# Patient Record
Sex: MALE | Race: WHITE | URBAN - NONMETROPOLITAN AREA
[De-identification: names, ages, dates, MRNs, and addresses within clinical notes are randomized per-mention and may not be internally consistent; named-entity substitution may affect disease eponyms.]

---

## 2019-04-06 ENCOUNTER — OFFICE VISIT (OUTPATIENT)
Dept: FAMILY MEDICINE | Facility: OTHER | Age: 44
End: 2019-04-06
Attending: NURSE PRACTITIONER
Payer: COMMERCIAL

## 2019-04-06 VITALS
HEART RATE: 88 BPM | WEIGHT: 217.5 LBS | DIASTOLIC BLOOD PRESSURE: 76 MMHG | SYSTOLIC BLOOD PRESSURE: 138 MMHG | RESPIRATION RATE: 16 BRPM | TEMPERATURE: 97.8 F

## 2019-04-06 DIAGNOSIS — R07.0 THROAT PAIN: ICD-10-CM

## 2019-04-06 DIAGNOSIS — J02.9 VIRAL PHARYNGITIS: Primary | ICD-10-CM

## 2019-04-06 LAB
DEPRECATED S PYO AG THROAT QL EIA: NORMAL
SPECIMEN SOURCE: NORMAL

## 2019-04-06 PROCEDURE — 99213 OFFICE O/P EST LOW 20 MIN: CPT | Performed by: NURSE PRACTITIONER

## 2019-04-06 PROCEDURE — 87880 STREP A ASSAY W/OPTIC: CPT | Performed by: NURSE PRACTITIONER

## 2019-04-06 ASSESSMENT — ENCOUNTER SYMPTOMS
CONSTITUTIONAL NEGATIVE: 1
MUSCULOSKELETAL NEGATIVE: 1
TROUBLE SWALLOWING: 0
EYES NEGATIVE: 1
NEUROLOGICAL NEGATIVE: 1
SORE THROAT: 1
ABDOMINAL PAIN: 0
COUGH: 0

## 2019-04-06 ASSESSMENT — PAIN SCALES - GENERAL: PAINLEVEL: NO PAIN (0)

## 2019-04-06 NOTE — NURSING NOTE
Chief Complaint   Patient presents with     Pharyngitis     Patient presents to the clinic today for sore throat issues for the past 2.5 weeks.     Initial /76 (BP Location: Right arm, Patient Position: Sitting, Cuff Size: Adult Regular)   Pulse 88   Temp 97.8  F (36.6  C) (Tympanic)   Resp 16   Wt 98.7 kg (217 lb 8 oz)  There is no height or weight on file to calculate BMI.  Medication Reconciliation: complete    Kacie Guillaume LPN

## 2019-04-06 NOTE — PATIENT INSTRUCTIONS
Patient Education     When You Have a Sore Throat    A sore throat can be painful. There are many reasons why you may have a sore throat. Your healthcare provider will work with you to find the cause of your sore throat. He or she will also find the best treatment for you.  What causes a sore throat?  Sore throats can be caused or worsened by:    Cold or flu viruses    Bacteria    Irritants such as tobacco smoke or air pollution    Acid reflux  A healthy throat  The tonsils are on the sides of the throat near the base of the tongue. They collect viruses and bacteria and help fight infection. The throat (pharynx) is the passage for air. Mucus from the nasal cavity also moves down the passage.  An inflamed throat  The tonsils and pharynx can become inflamed due to a cold or flu virus. Postnasal drip (excess mucus draining from the nasal cavity) can irritate the throat. It can also make the throat or tonsils more likely to be infected by bacteria. Severe, untreated tonsillitis in children or adults can cause a pocket of pus (abscess) to form near the tonsil.  Your evaluation  A medical evaluation can help find the cause of your sore throat. It can also help your healthcare provider choose the best treatment for you. The evaluation may include a health history, physical exam, and diagnostic tests.  Health history  Your healthcare provider may ask you the following:    How long has the sore throat lasted and how have you been treating it?    Do you have any other symptoms, such as body aches, fever, or cough?    Does your sore throat recur? If so, how often? How many days of school or work have you missed because of a sore throat?    Do you have trouble eating or swallowing?    Have you been told that you snore or have other sleep problems?    Do you have bad breath?    Do you cough up bad-tasting mucus?  Physical exam  During the exam, your healthcare provider checks your ears, nose, and throat for problems. He or she  "also checks for swelling in the neck, and may listen to your chest.  Possible tests  Other tests your healthcare provider may perform include:    A throat swab to check for bacteria such as streptococcus (the bacteria that causes strep throat)    A blood test to check for mononucleosis (a viral infection)    A chest X-ray to rule out pneumonia, especially if you have a cough  Treating a sore throat  Treatment depends on many factors. What is the likely cause? Is the problem recent? Does it keep coming back? In many cases, the best thing to do is to treat the symptoms, rest, and let the problem heal itself. Antibiotics may help clear up some bacterial infections. For cases of severe or recurring tonsillitis, the tonsils may need to be removed.  Relieving your symptoms    Don t smoke, and avoid secondhand smoke.    For children, try throat sprays or Popsicles. Adults and older children may try lozenges.    Drink warm liquids to soothe the throat and help thin mucus. Avoid alcohol, spicy foods, and acidic drinks such as orange juice. These can irritate the throat.    Gargle with warm saltwater (1 teaspoon of salt to 8 ounces of warm water).    Use a humidifier to keep air moist and relieve throat dryness.    Try over-the-counter pain relievers such as acetaminophen or ibuprofen. Use as directed, and don t exceed the recommended dose. Don t give aspirin to children.   Are antibiotics needed?  If your sore throat is due to a bacterial infection, antibiotics may speed healing and prevent complications. Although group A streptococcus (\"strep throat\" or GAS) is the major treatable infection for a sore throat, GAS causes only 5% to 15% of sore throats in adults who seek medical care. Most sore throats are caused by cold or flu viruses. And antibiotics don t treat viral illness. In fact, using antibiotics when they re not needed may produce bacteria that are harder to kill. Your healthcare provider will prescribe antibiotics " only if he or she thinks they are likely to help.  If antibiotics are prescribed  Take the medicine exactly as directed. Be sure to finish your prescription even if you re feeling better. And be sure to ask your healthcare provider or pharmacist what side effects are common and what to do about them.  Is surgery needed?  In some cases, tonsils need to be removed. This is often done as outpatient (same-day) surgery. Your healthcare provider may advise removing the tonsils in cases of:    Several severe bouts of tonsillitis in a year.  Severe  episodes include those that lead to missed days of school or work, or that need to be treated with antibiotics.    Tonsillitis that causes breathing problems during sleep    Tonsillitis caused by food particles collecting in pouches in the tonsils (cryptic tonsillitis)  Call your healthcare provider if any of the following occur:    Symptoms worsen, or new symptoms develop.    Swollen tonsils make breathing difficult.    The pain is severe enough to keep you from drinking liquids.    A skin rash, hives, or wheezing develops. Any of these could signal an allergic reaction to antibiotics.    Symptoms don t improve within a week.    Symptoms don t improve within 2 to 3 days of starting antibiotics.   Date Last Reviewed: 10/1/2016    2367-7508 [employer]. [address] [place] [address], [place] [address]. All rights reserved. This information is not intended as a substitute for professional medical care. Always follow your healthcare professional's instructions.

## 2019-04-06 NOTE — PROGRESS NOTES
SUBJECTIVE:   Darren Aguillon is a 43 year old male who presents to clinic today for the following health issues:    HPI  Presents with a sore throat for 3 days. Lingering cough from URI 3 weeks ago, no sinus congestion. No fever. Eating and drinking well. No analgesics.     There are no active problems to display for this patient.    Past Medical History:   Diagnosis Date     Personal history of other medical treatment (CODE)     No Comments Provided      Past Surgical History:   Procedure Laterality Date     OTHER SURGICAL HISTORY      [id number],NO PREVIOUS SURGERY     No family history on file.  Social History     Tobacco Use     Smoking status: Never Smoker     Smokeless tobacco: Never Used   Substance Use Topics     Alcohol use: No     Social History     Social History Narrative    p 9/24/2013.     No current outpatient medications on file.     No Known Allergies    Review of Systems   Constitutional: Negative.    HENT: Positive for sore throat. Negative for trouble swallowing.    Eyes: Negative.    Respiratory: Negative for cough.    Gastrointestinal: Negative for abdominal pain.   Musculoskeletal: Negative.    Skin: Negative.    Neurological: Negative.         OBJECTIVE:     /76 (BP Location: Right arm, Patient Position: Sitting, Cuff Size: Adult Regular)   Pulse 88   Temp 97.8  F (36.6  C) (Tympanic)   Resp 16   Wt 98.7 kg (217 lb 8 oz)   There is no height or weight on file to calculate BMI.  Physical Exam   Constitutional: He is oriented to person, place, and time. He appears well-developed and well-nourished. No distress.   HENT:   Head: Normocephalic.   Right Ear: Tympanic membrane and external ear normal.   Left Ear: Tympanic membrane and external ear normal.   Nose: Nose normal.   Mouth/Throat: Uvula is midline, oropharynx is clear and moist and mucous membranes are normal. No oropharyngeal exudate.   Eyes: Conjunctivae are normal. Right eye exhibits no discharge. Left eye exhibits no  discharge.   Neck: Normal range of motion. Neck supple.   Cardiovascular: Normal rate, regular rhythm and normal heart sounds.   No murmur heard.  Pulmonary/Chest: Effort normal and breath sounds normal.   Musculoskeletal: Normal range of motion.   Lymphadenopathy:     He has no cervical adenopathy.   Neurological: He is alert and oriented to person, place, and time.   Skin: Skin is warm and dry. He is not diaphoretic.   Psychiatric: He has a normal mood and affect. His behavior is normal.   Nursing note and vitals reviewed.      Diagnostic Test Results:  Results for orders placed or performed in visit on 04/06/19 (from the past 24 hour(s))   Rapid strep screen   Result Value Ref Range    Specimen Description Throat     Rapid Strep A Screen       Negative presumptive for Group A Beta Streptococcus       ASSESSMENT/PLAN:       ICD-10-CM    1. Viral pharyngitis J02.9    2. Throat pain R07.0 Rapid strep screen     Afebrile, benign exam, rapid strep negative.     PLAN:  Conservative treatments advised:   Tylenol, Ibuprofen, Fluids, Rest and Saline nasal spray    Followup:  If not improving or if condition worsens, follow up with your Primary Care Provider  Return here as needed.   I explained my diagnostic considerations and recommendations to the patient, who voiced understanding and agreement with the treatment plan. All questions were answered.     Disclaimer:  This note consists of words and symbols derived from keyboarding, dictation, or using voice recognition software. As a result, there may be errors in the script that have gone undetected. Please consider this when interpreting information found in this note.      NITIN Mosley, NP-C  4/6/2019 at 1:36 PM  [facility]

## 2019-07-30 ENCOUNTER — OFFICE VISIT (OUTPATIENT)
Dept: FAMILY MEDICINE | Facility: OTHER | Age: 44
End: 2019-07-30
Attending: NURSE PRACTITIONER
Payer: COMMERCIAL

## 2019-07-30 VITALS
WEIGHT: 214.2 LBS | OXYGEN SATURATION: 98 % | SYSTOLIC BLOOD PRESSURE: 128 MMHG | TEMPERATURE: 97.3 F | BODY MASS INDEX: 31.73 KG/M2 | DIASTOLIC BLOOD PRESSURE: 70 MMHG | HEIGHT: 69 IN | HEART RATE: 62 BPM | RESPIRATION RATE: 16 BRPM

## 2019-07-30 DIAGNOSIS — L25.9 CONTACT DERMATITIS, UNSPECIFIED CONTACT DERMATITIS TYPE, UNSPECIFIED TRIGGER: Primary | ICD-10-CM

## 2019-07-30 PROCEDURE — 99213 OFFICE O/P EST LOW 20 MIN: CPT | Performed by: NURSE PRACTITIONER

## 2019-07-30 RX ORDER — FLUOCINONIDE 0.5 MG/G
OINTMENT TOPICAL 2 TIMES DAILY
Qty: 60 G | Refills: 0 | Status: SHIPPED | OUTPATIENT
Start: 2019-07-30 | End: 2019-08-09

## 2019-07-30 ASSESSMENT — PAIN SCALES - GENERAL: PAINLEVEL: NO PAIN (0)

## 2019-07-30 ASSESSMENT — MIFFLIN-ST. JEOR: SCORE: 1851.98

## 2019-07-30 NOTE — PATIENT INSTRUCTIONS
"Patient Education     Contact Dermatitis  Contact dermatitis is a skin rash caused by something that touches the skin and makes it irritated and inflamed. Your skin may be red, swollen, dry, and may be cracked. Blisters may form and ooze. The rash will itch.  Contact dermatitis can form on the face and neck, backs of hands, forearms, genitals, and lower legs.  People can get contact dermatitis from lots of sources. These include:    Plants such as poison ivy, oak, or sumac    Chemicals in hair dyes and rinses, soaps, solvents, waxes, fingernail polish, and deodorants     Jewelry or watchbands made of nickel  Contact dermatitis is not passed from person to person.  Talk with your healthcare provider about what may have caused the rash. A type of allergy testing called \"patch testing\" may be used to discover what you are allergic to. You will need to avoid the source of your rash in the future to prevent it from coming back.  Treatment is done to relieve itching and prevent the rash from coming back. The rash should go away in a few days to a few weeks.  Home care  Your healthcare provider may prescribe medicine to relieve swelling and itching. Follow all instructions when using these medicines.  General care:    Avoid anything that heats up your skin, such as hot showers or baths, or direct sunlight. This can make itching worse.    Apply cold compresses to soothe your sores to help relieve your symptoms. Do this for 30 minutes 3 to 4 times a day. You can make a cold compress by soaking a cloth in cold water. Squeeze out excess water. You can add colloidal oatmeal to the water to help reduce itching. For severe itching in a small area, apply an ice pack wrapped in a thin towel. Do this for 20 minutes 3 to 4 times a day.    You can also try wet dressings. One way to do this is to wear a wet piece of clothing under a dry one. Wear a damp shirt under a dry shirt if your upper body is affected. This can relieve itching " and prevent you from scratching the affected area.    You can also help relieve large areas of itching by taking a lukewarm bath with colloidal oatmeal added to the water.    Use hydrocortisone cream for redness and irritation, unless another medicine was prescribed. You can also use benzocaine anesthetic cream or spray. Calamine lotion can also relieve mild symptoms.    Use oral diphenhydramine to help reduce itching. You can buy this antihistamine at drug and grocery stores. It can make you sleepy, so use lower doses during the daytime. Or you can use loratadine. This is an antihistamine that will not make you sleepy. Do not use diphenhydramine if you have glaucoma or have trouble urinating due to an enlarged prostate.    If a plant causes your rash, make sure to wash your skin and the clothes you were wearing when you came into contact with the plant. This is to wash away the plant oils that gave you the rash and prevent more or worse symptoms.    Stay away from the substance or object that causes your symptoms. If you can t avoid it, wear gloves or some other type of protection.  Follow-up care  Follow up with your healthcare provider, or as advised.  When to seek medical advice  Call your healthcare provider right away if any of these occur:    Spreading of the rash to other parts of your body    Severe swelling of your face, eyelids, mouth, throat or tongue    Trouble urinating due to swelling in the genital area    Fever of 100.4 F (38 C) or higher    Redness or swelling that gets worse    Pain that gets worse    Foul-smelling fluid leaking from the skin    Yellow-brown crusts on the open blisters

## 2019-07-30 NOTE — NURSING NOTE
"Patient presents to clinic for derm problem on right side of neck and possible tick bite. States headache last week and dizziness. He said he felt something behind his ear and on his side. No embedded tick and is unsure of what he felt. 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   Chief Complaint   Patient presents with     Derm Problem       Initial /70 (BP Location: Left arm, Patient Position: Sitting, Cuff Size: Adult Large)   Pulse 62   Temp 97.3  F (36.3  C) (Tympanic)   Resp 16   Ht 1.753 m (5' 9\")   Wt 97.2 kg (214 lb 3.2 oz)   SpO2 98%   BMI 31.63 kg/m   Estimated body mass index is 31.63 kg/m  as calculated from the following:    Height as of this encounter: 1.753 m (5' 9\").    Weight as of this encounter: 97.2 kg (214 lb 3.2 oz).  Medication Reconciliation: complete    Mona Antonio LPN    "

## 2019-07-30 NOTE — PROGRESS NOTES
"Nursing Notes:   Mona Antonio LPN  7/30/2019 12:27 PM  Signed  Patient presents to clinic for derm problem on right side of neck and possible tick bite. States headache last week and dizziness. He said he felt something behind his ear and on his side. No embedded tick and is unsure of what he felt. 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   Chief Complaint   Patient presents with     Derm Problem       Initial /70 (BP Location: Left arm, Patient Position: Sitting, Cuff Size: Adult Large)   Pulse 62   Temp 97.3  F (36.3  C) (Tympanic)   Resp 16   Ht 1.753 m (5' 9\")   Wt 97.2 kg (214 lb 3.2 oz)   SpO2 98%   BMI 31.63 kg/m    Estimated body mass index is 31.63 kg/m  as calculated from the following:    Height as of this encounter: 1.753 m (5' 9\").    Weight as of this encounter: 97.2 kg (214 lb 3.2 oz).  Medication Reconciliation: complete    Mona Antonio LPN      SUBJECTIVE:   Darren Aguillon is a 43 year old male who presents to clinic today for the following health issues:    Patient presents to the rapid clinic for a rash that he noted behind his neck.  He reports using a new bug spray and then the rash developed.  He has not used anything over-the-counter for symptom relief.  Rashes been present for 2 to 3 days.    He is concerned that the rash might be related to a possible tick bite that he received about a week ago.  He did not actually pull off a tick however he did notice a red papule when he woke up.  There is been no fevers, chills, red ring rashes, neck pain, joint stiffness, nausea or vomiting.  He reports having a couple headaches however he states that this could have been because he was dehydrated.  He does work outside and he does wear tick repellent clothes.     Problem list and histories reviewed & adjusted, as indicated.  Additional history: as documented    There is no problem list on file for this patient.    Past Surgical History:   Procedure Laterality " "Date     OTHER SURGICAL HISTORY      [id number],NO PREVIOUS SURGERY       Social History     Tobacco Use     Smoking status: Former Smoker     Smokeless tobacco: Never Used   Substance Use Topics     Alcohol use: No     No family history on file.      Current Outpatient Medications   Medication Sig Dispense Refill     fluocinonide (LIDEX) 0.05 % external ointment Apply topically 2 times daily for 10 days 60 g 0     No Known Allergies      ROS:  Notable findings in the HPI.       OBJECTIVE:     /70 (BP Location: Left arm, Patient Position: Sitting, Cuff Size: Adult Large)   Pulse 62   Temp 97.3  F (36.3  C) (Tympanic)   Resp 16   Ht 1.753 m (5' 9\")   Wt 97.2 kg (214 lb 3.2 oz)   SpO2 98%   BMI 31.63 kg/m    Body mass index is 31.63 kg/m .  GENERAL: healthy, alert and no distress  EYES: Eyes grossly normal to inspection  HENT: normal cephalic/atraumatic and oral mucous membranes moist  RESP: Without increased work of breathing  CV: regular rates and rhythm  SKIN: Examination of the rash reveals: Contact dermatitis with several dry, slightly raised, red patches with mild flaking along the posterior neck line.   PSYCH: mentation appears normal, affect normal/bright    Diagnostic Test Results:  none     ASSESSMENT/PLAN:     1. Contact dermatitis, unspecified contact dermatitis type, unspecified trigger  - fluocinonide (LIDEX) 0.05 % external ointment; Apply topically 2 times daily for 10 days  Dispense: 60 g; Refill: 0      PLAN:  Reassurance provided that this rash does not appear to be erythematous migrans.  Skin appearance is consistent with contact dermatitis either from brush that he was removing or the bug spray reacting to his skin.  At this point it does not appear to be an infectious process.    We did discuss signs and symptoms to watch for for either Lyme disease or anaplasmosis.  He is without fevers, did not remove a tick, wears protective clothing, he is without signs and symptoms of systemic " illness at this time.  Did offer lab testing however he is reassured and feels that it is not necessary.  We will treat the rash with topical lidex and have him follow-up if other symptoms develop.    Followup:    If not improving or if condition worsens, follow up with your Primary Care Provider    I explained my diagnostic considerations and recommendations to the patient, who voiced understanding and agreement with the treatment plan. All questions were answered. We discussed potential side effects of any prescribed or recommended therapies, as well as expectations for response to treatments.  He was advised to contact our office if there is no improvement or worsening of conditions or symptoms.  If s/s worsen or persist, patient will either come back or follow up with PCP.    Disclaimer:  This note consists of words and symbols derived from keyboarding, dictation, or using voice recognition software. As a result, there may be errors in the script that have gone undetected. Please consider this when interpreting information found in this note.      Pam Thrasher NP, 7/30/2019 1:01 PM

## 2025-04-26 ENCOUNTER — APPOINTMENT (OUTPATIENT)
Dept: CT IMAGING | Facility: OTHER | Age: 50
End: 2025-04-26
Attending: FAMILY MEDICINE
Payer: COMMERCIAL

## 2025-04-26 ENCOUNTER — HOSPITAL ENCOUNTER (EMERGENCY)
Facility: OTHER | Age: 50
Discharge: HOME | End: 2025-04-26
Attending: FAMILY MEDICINE
Payer: COMMERCIAL

## 2025-04-26 VITALS
SYSTOLIC BLOOD PRESSURE: 126 MMHG | WEIGHT: 214 LBS | DIASTOLIC BLOOD PRESSURE: 78 MMHG | HEART RATE: 80 BPM | BODY MASS INDEX: 31.7 KG/M2 | RESPIRATION RATE: 16 BRPM | OXYGEN SATURATION: 97 % | TEMPERATURE: 97.1 F | HEIGHT: 69 IN

## 2025-04-26 DIAGNOSIS — S01.01XA SCALP LACERATION, INITIAL ENCOUNTER: ICD-10-CM

## 2025-04-26 DIAGNOSIS — S01.312A COMPLEX LACERATION OF EAR, LEFT, INITIAL ENCOUNTER: ICD-10-CM

## 2025-04-26 PROCEDURE — 70450 CT HEAD/BRAIN W/O DYE: CPT | Mod: 26 | Performed by: RADIOLOGY

## 2025-04-26 PROCEDURE — 13122 CMPLX RPR S/A/L ADDL 5 CM/>: CPT | Performed by: FAMILY MEDICINE

## 2025-04-26 PROCEDURE — 70450 CT HEAD/BRAIN W/O DYE: CPT

## 2025-04-26 PROCEDURE — 13152 CMPLX RPR E/N/E/L 2.6-7.5 CM: CPT | Performed by: SURGERY

## 2025-04-26 PROCEDURE — 99284 EMERGENCY DEPT VISIT MOD MDM: CPT | Performed by: FAMILY MEDICINE

## 2025-04-26 PROCEDURE — G0390 TRAUMA RESPONS W/HOSP CRITI: HCPCS | Performed by: FAMILY MEDICINE

## 2025-04-26 PROCEDURE — 96374 THER/PROPH/DIAG INJ IV PUSH: CPT | Performed by: FAMILY MEDICINE

## 2025-04-26 PROCEDURE — 99291 CRITICAL CARE FIRST HOUR: CPT | Mod: 25 | Performed by: FAMILY MEDICINE

## 2025-04-26 PROCEDURE — 72125 CT NECK SPINE W/O DYE: CPT

## 2025-04-26 PROCEDURE — 96375 TX/PRO/DX INJ NEW DRUG ADDON: CPT | Mod: XU | Performed by: FAMILY MEDICINE

## 2025-04-26 PROCEDURE — 72125 CT NECK SPINE W/O DYE: CPT | Mod: 26 | Performed by: RADIOLOGY

## 2025-04-26 PROCEDURE — 13152 CMPLX RPR E/N/E/L 2.6-7.5 CM: CPT | Performed by: FAMILY MEDICINE

## 2025-04-26 PROCEDURE — 250N000011 HC RX IP 250 OP 636: Performed by: FAMILY MEDICINE

## 2025-04-26 PROCEDURE — 13121 CMPLX RPR S/A/L 2.6-7.5 CM: CPT | Performed by: FAMILY MEDICINE

## 2025-04-26 PROCEDURE — 90471 IMMUNIZATION ADMIN: CPT | Performed by: FAMILY MEDICINE

## 2025-04-26 PROCEDURE — 13122 CMPLX RPR S/A/L ADDL 5 CM/>: CPT | Performed by: SURGERY

## 2025-04-26 PROCEDURE — 250N000009 HC RX 250: Performed by: FAMILY MEDICINE

## 2025-04-26 PROCEDURE — 90715 TDAP VACCINE 7 YRS/> IM: CPT | Performed by: FAMILY MEDICINE

## 2025-04-26 PROCEDURE — 13121 CMPLX RPR S/A/L 2.6-7.5 CM: CPT | Mod: 51 | Performed by: SURGERY

## 2025-04-26 RX ORDER — LIDOCAINE HYDROCHLORIDE AND EPINEPHRINE 10; 10 MG/ML; UG/ML
1 INJECTION, SOLUTION INFILTRATION; PERINEURAL ONCE
Status: COMPLETED | OUTPATIENT
Start: 2025-04-26 | End: 2025-04-26

## 2025-04-26 RX ORDER — LIDOCAINE HYDROCHLORIDE 10 MG/ML
5 INJECTION, SOLUTION INFILTRATION; PERINEURAL ONCE
Status: COMPLETED | OUTPATIENT
Start: 2025-04-26 | End: 2025-04-26

## 2025-04-26 RX ORDER — LORAZEPAM 2 MG/ML
0.5 INJECTION INTRAMUSCULAR ONCE
Status: COMPLETED | OUTPATIENT
Start: 2025-04-26 | End: 2025-04-26

## 2025-04-26 RX ORDER — CEFAZOLIN SODIUM/WATER 2 G/20 ML
2 SYRINGE (ML) INTRAVENOUS ONCE
Status: COMPLETED | OUTPATIENT
Start: 2025-04-26 | End: 2025-04-26

## 2025-04-26 RX ADMIN — Medication 2 G: at 18:45

## 2025-04-26 RX ADMIN — LORAZEPAM 0.5 MG: 2 INJECTION INTRAMUSCULAR; INTRAVENOUS at 16:55

## 2025-04-26 RX ADMIN — LIDOCAINE HYDROCHLORIDE 5 ML: 10 INJECTION, SOLUTION EPIDURAL; INFILTRATION; INTRACAUDAL; PERINEURAL at 16:56

## 2025-04-26 RX ADMIN — LIDOCAINE HYDROCHLORIDE AND EPINEPHRINE 1 ML: 10; 10 INJECTION, SOLUTION INFILTRATION; PERINEURAL at 16:56

## 2025-04-26 RX ADMIN — CLOSTRIDIUM TETANI TOXOID ANTIGEN (FORMALDEHYDE INACTIVATED), CORYNEBACTERIUM DIPHTHERIAE TOXOID ANTIGEN (FORMALDEHYDE INACTIVATED), BORDETELLA PERTUSSIS TOXOID ANTIGEN (GLUTARALDEHYDE INACTIVATED), BORDETELLA PERTUSSIS FILAMENTOUS HEMAGGLUTININ ANTIGEN (FORMALDEHYDE INACTIVATED), BORDETELLA PERTUSSIS PERTACTIN ANTIGEN, AND BORDETELLA PERTUSSIS FIMBRIAE 2/3 ANTIGEN 0.5 ML: 5; 2; 2.5; 5; 3; 5 INJECTION, SUSPENSION INTRAMUSCULAR at 18:03

## 2025-04-26 ASSESSMENT — ACTIVITIES OF DAILY LIVING (ADL)
ADLS_ACUITY_SCORE: 41

## 2025-04-26 ASSESSMENT — ENCOUNTER SYMPTOMS
FACIAL ASYMMETRY: 0
LIGHT-HEADEDNESS: 0
SPEECH DIFFICULTY: 0
TREMORS: 0
NECK PAIN: 1
DIZZINESS: 0
HEADACHES: 1
WEAKNESS: 0

## 2025-04-26 ASSESSMENT — COLUMBIA-SUICIDE SEVERITY RATING SCALE - C-SSRS
6. HAVE YOU EVER DONE ANYTHING, STARTED TO DO ANYTHING, OR PREPARED TO DO ANYTHING TO END YOUR LIFE?: NO
1. IN THE PAST MONTH, HAVE YOU WISHED YOU WERE DEAD OR WISHED YOU COULD GO TO SLEEP AND NOT WAKE UP?: NO
2. HAVE YOU ACTUALLY HAD ANY THOUGHTS OF KILLING YOURSELF IN THE PAST MONTH?: NO

## 2025-04-26 NOTE — ED TRIAGE NOTES
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  Laceration to head, bled profusely initially.  Bleeding controlled and bandaged on arrival. C collar placed in field, pt had head and neck pain.  Unknown LOC.  Pain 3/10 at this time. No vision problems.     Triage Assessment (Adult)       Row Name 04/26/25 1230          Triage Assessment    Airway WDL WDL        Respiratory WDL    Respiratory WDL WDL        Cardiac WDL    Cardiac WDL WDL        Peripheral/Neurovascular WDL    Peripheral Neurovascular WDL WDL        Cognitive/Neuro/Behavioral WDL    Cognitive/Neuro/Behavioral WDL WDL

## 2025-04-26 NOTE — ED PROVIDER NOTES
History     Chief Complaint   Patient presents with    Trauma    Neck Pain    Laceration     HPI  Darren Aguillon is a 49 year old male who brought in by his significant other for concern related to trauma.  Dalton stand fell and hit him on the top of the head.  He has a big laceration on the top of his head and also of the left ear.  He does not know if he lost consciousness but he hit the ground.  Denies nausea or vomiting.  Does not complain of significant headache at this time.  Does complain of some neck pain.  Patient was seen immediately on arrival, partial trauma was called.    Cervical spine CT were done and were negative.  I attempted nerve block including a full auricular ring block to further explore the ear wound and lesion.  There was significant damage to the cartilage.  I called surgery for further consultation for repair.    Laceration on top of head was through the periosteum.  Surgical consultation for repair of this injury as well.  See media photos for further details.      Allergies:  No Known Allergies    Problem List:    There are no active problems to display for this patient.       Past Medical History:    Past Medical History:   Diagnosis Date    Personal history of other medical treatment (CODE)        Past Surgical History:    Past Surgical History:   Procedure Laterality Date    OTHER SURGICAL HISTORY      [id number],NO PREVIOUS SURGERY       Family History:    No family history on file.    Social History:  Marital Status:   [2]  Social History     Tobacco Use    Smoking status: Former    Smokeless tobacco: Never   Substance Use Topics    Alcohol use: No    Drug use: Never     Comment: Drug use: No        Medications:    No current outpatient medications on file.        Review of Systems   Cardiovascular:  Negative for chest pain.   Musculoskeletal:  Positive for neck pain.   Neurological:  Positive for headaches. Negative for dizziness, tremors, syncope, facial asymmetry, speech  "difficulty, weakness and light-headedness.       Physical Exam   BP: (!) 143/94  Pulse: 79  Temp: 97.1  F (36.2  C)  Resp: 16  Height: 175.3 cm (5' 9\")  Weight: 97.1 kg (214 lb)  SpO2: 95 %      Physical Exam  Constitutional:       General: He is not in acute distress.     Appearance: Normal appearance. He is not toxic-appearing.   HENT:      Head:      Comments: Scalp laceration at least 12 cm long and down to the periosteum.     Ears:      Comments: Left ear with significant cartilaginous tear.  Auricular block performed.  We are able to clean the ear up.  It does not appear to extend into the external auditory canal.     Mouth/Throat:      Mouth: Mucous membranes are dry.   Eyes:      General: No scleral icterus.     Conjunctiva/sclera: Conjunctivae normal.   Cardiovascular:      Rate and Rhythm: Normal rate and regular rhythm.      Pulses: Normal pulses.      Heart sounds: Normal heart sounds. No murmur heard.  Pulmonary:      Effort: Pulmonary effort is normal. No respiratory distress.      Breath sounds: Normal breath sounds.   Abdominal:      Palpations: Abdomen is soft.      Tenderness: There is no abdominal tenderness.   Musculoskeletal:         General: No deformity.      Cervical back: Neck supple.   Skin:     General: Skin is warm.      Coloration: Skin is not jaundiced.   Neurological:      General: No focal deficit present.      Mental Status: He is alert.         ED Course     ED Course as of 04/26/25 1859   Sat Apr 26, 2025   1533 Partial trauma. Deer stand fell on head. Large lac to head, with bandaid on and C collar. Will go to CT and then eval injury.   1644 Spoke with on call surgery. Will numb area and clean it and take photos for chart   1819 Surgery consult to help repair wounds     Procedures              Critical Care time:  none     IV Antibiotics given and/or elevated Lactate of 0 and no sepsis note found - Delete this reminder and enter the sepsis note or '.edcms' before signing " chart.>>>None         Results for orders placed or performed during the hospital encounter of 04/26/25 (from the past 24 hours)   CT Head w/o Contrast    Narrative    EXAM: CT HEAD W/O CONTRAST  LOCATION: [facility]  DATE: 4/26/2025    INDICATION: Deer stand fell on head, headache.  COMPARISON: None.  TECHNIQUE: Routine CT Head without IV contrast. Multiplanar reformats. Dose reduction techniques were used.    FINDINGS:  INTRACRANIAL CONTENTS: No intracranial hemorrhage, extraaxial collection, or mass effect. No CT evidence of acute infarct. Normal parenchymal attenuation for age. Corpus callosum is normal. Cerebellar tonsillar position is satisfactory. No sella or   suprasellar mass/hemorrhage. Normal ventricles and sulcation for age.     VISUALIZED ORBITS/SINUSES/MASTOIDS: No intraorbital abnormality. No paranasal sinus mucosal disease. No middle ear or mastoid effusion.    BONES/SOFT TISSUES: Soft tissue laceration and mild swelling left frontal scalp laterally. No radiopaque foreign bodies. Overlying gauze/bandaging. The outer table of left frontal bone is intact at this level series 3 image 14. No skull fracture.      Impression    IMPRESSION:  1.  No acute intracranial process.    2.  No fracture of the calvarium or skull base.    3.  Left frontal scalp deep soft tissue laceration extends to the outer cortex of the left calvarial/frontal bone; however, the outer cortex is intact series 3 image 13. No radiopaque foreign bodies are evident with overlying gauze/bandaging.   CT Cervical Spine w/o Contrast    Narrative    EXAM: CT CERVICAL SPINE W/O CONTRAST  LOCATION: [facility]  DATE: 4/26/2025    INDICATION: Deer stand fell on head, headache.  COMPARISON: None.  TECHNIQUE: Routine CT Cervical Spine without IV contrast. Multiplanar reformats. Dose reduction techniques were used.    FINDINGS:  VERTEBRA: Satisfactory cervical vertebral body height. 1 mm anterior  subluxation C4-C5 and C5-C6. No high-grade subluxations. Occipital condyles are normal. Mild endplate osteophytes lower cervical levels. Articular pillars are intact. Foramen magnum is   normal. C1 ring is intact. Foramen transversarium are intact throughout the cervical spine. Hyoid bone and neck cartilage are normal. No displaced upper rib fractures are noted. No acute cervical fracture or posttraumatic subluxation.     CANAL/FORAMINA: No significant cervical canal or neural foraminal stenosis.    PARASPINAL: No significant disc herniations. No focal fluid collections, mass, adenopathy or hematoma are evident within the neck soft tissues. Lung apices appear clear. Thyroid is satisfactory overall. Mastoid air cells are clear.      Impression    IMPRESSION:  1.  No acute cervical fracture or posttraumatic subluxation.    2.  No high-grade spinal canal or neural foraminal stenosis.    3.  Mild degenerative changes mid and lower cervical levels.       Medications   lidocaine 1 % injection 5 mL (5 mLs Other $Given 4/26/25 1656)   lidocaine 1% with EPINEPHrine 1:100,000 injection 1 mL (1 mL Other $Given 4/26/25 1656)   LORazepam (ATIVAN) injection 0.5 mg (0.5 mg Intravenous $Given 4/26/25 1655)   Tdap (tetanus-diphtheria-acell pertussis) (ADACEL) injection 0.5 mL (0.5 mLs Intramuscular $Given 4/26/25 1803)   ceFAZolin Sodium (ANCEF) injection 2 g (2 g Intravenous $Given 4/26/25 1845)       Assessments & Plan (with Medical Decision Making)     I have reviewed the nursing notes.    I have reviewed the findings, diagnosis, plan and need for follow up with the patient.           Medical Decision Making  The patient's presentation was of moderate complexity (an acute complicated injury).    The patient's evaluation involved:  ordering and/or review of 2 test(s) in this encounter (see separate area of note for details)    The patient's management necessitated moderate risk (prescription drug management including medications  given in the ED).    Surgical consultation for repair of lacerations        New Prescriptions    No medications on file       Final diagnoses:   Complex laceration of ear, left, initial encounter   Scalp laceration, initial encounter   See Dr. Shaw's note for laceration repair details.  Patient was discharged in stable condition with outpatient follow-up for suture removal.    CT and neck CT negative for acute findings.    4/26/2025   [facility]       Heidi Mckeon, DO  04/26/25 1900       Heidi Mckeon,   04/26/25 1906

## 2025-04-29 NOTE — PROCEDURES
Procedure Note      Pre/Post Operative Diagnosis: Partial avulsion left ear, traumatic laceration left frontal scalp.    Procedure: Complex closure of 6 cm ear laceration to the level of the cartilage, closure of 9 cm scalp laceration to the level of the skull.    Surgeon: Sven Shaw MD    Local Anesthesia: 1% lidocaine with 0.25% Marcaine with epinephrine    Indication for the procedure:  Darren had a deer stand fall and has had.  He suffered laceration to his scalp and ear.  The ER provider did not believe that she could get this closed successfully.  She consulted with surgery for closure.     After explaining the risks to include bleeding, infection, recurrence or need for reexcision, and scarring the patient wished to proceed.    Procedure:   The left ear area was prepped and draped in usual sterile fashion with ChloraPrep.   After, adequate local anesthesia, the previous partial closure of the left ear was opened up.  There was a stellate type laceration/fracture of the cartilage.  4-0 Vicryl was used to repair the cartilage structure and interrupted sutures.  The skin was then closed with 5-0 nylon in a running fashion.  A total of 6 cm required closure.    The scalp laceration extended to the level of the frontal skull.  Galea was disrupted.  A 2-0 Vicryl was used in a running fashion to close the galea.  The subcutaneous tissues was closed with 3-0 Vicryl.  4-0 nylon was then used in a interrupted fashion to close the skin.    Plan:  The patient had received tetanus and Ancef prophylaxis while in the ER.    Recommend approximately 7-day interval for suture removal with clinic or ER as available.      Sven Shaw MD....................  4/29/2025   2:54 PM

## 2025-05-02 ENCOUNTER — HOSPITAL ENCOUNTER (EMERGENCY)
Facility: OTHER | Age: 50
Discharge: HOME | End: 2025-05-02
Payer: COMMERCIAL

## 2025-05-02 VITALS
BODY MASS INDEX: 31.6 KG/M2 | WEIGHT: 214 LBS | HEART RATE: 76 BPM | DIASTOLIC BLOOD PRESSURE: 90 MMHG | RESPIRATION RATE: 16 BRPM | OXYGEN SATURATION: 100 % | TEMPERATURE: 96.8 F | SYSTOLIC BLOOD PRESSURE: 146 MMHG

## (undated) RX ORDER — LIDOCAINE HYDROCHLORIDE 10 MG/ML
INJECTION, SOLUTION EPIDURAL; INFILTRATION; INTRACAUDAL; PERINEURAL
Status: DISPENSED
Start: 2025-04-26

## (undated) RX ORDER — LORAZEPAM 2 MG/ML
INJECTION INTRAMUSCULAR
Status: DISPENSED
Start: 2025-04-26

## (undated) RX ORDER — LIDOCAINE HYDROCHLORIDE AND EPINEPHRINE 10; 10 MG/ML; UG/ML
INJECTION, SOLUTION INFILTRATION; PERINEURAL
Status: DISPENSED
Start: 2025-04-26